# Patient Record
Sex: FEMALE | URBAN - METROPOLITAN AREA
[De-identification: names, ages, dates, MRNs, and addresses within clinical notes are randomized per-mention and may not be internally consistent; named-entity substitution may affect disease eponyms.]

---

## 2023-10-24 ENCOUNTER — APPOINTMENT (RX ONLY)
Dept: URBAN - METROPOLITAN AREA CLINIC 151 | Facility: CLINIC | Age: 68
Setting detail: DERMATOLOGY
End: 2023-10-24

## 2023-10-24 DIAGNOSIS — L57.8 OTHER SKIN CHANGES DUE TO CHRONIC EXPOSURE TO NONIONIZING RADIATION: ICD-10-CM

## 2023-10-24 DIAGNOSIS — L40.8 OTHER PSORIASIS: ICD-10-CM

## 2023-10-24 PROCEDURE — ? COUNSELING

## 2023-10-24 PROCEDURE — ? PRESCRIPTION

## 2023-10-24 PROCEDURE — ? PRESCRIPTION MEDICATION MANAGEMENT

## 2023-10-24 PROCEDURE — ? DIAGNOSIS COMMENT

## 2023-10-24 PROCEDURE — 99204 OFFICE O/P NEW MOD 45 MIN: CPT

## 2023-10-24 PROCEDURE — ? SEPARATE AND IDENTIFIABLE DOCUMENTATION

## 2023-10-24 RX ORDER — ROFLUMILAST 3 MG/G
CREAM TOPICAL
Qty: 60 | Refills: 2 | Status: ERX | COMMUNITY
Start: 2023-10-24

## 2023-10-24 RX ADMIN — ROFLUMILAST: 3 CREAM TOPICAL at 00:00

## 2023-10-24 ASSESSMENT — LOCATION SIMPLE DESCRIPTION DERM
LOCATION SIMPLE: RIGHT AXILLARY VAULT
LOCATION SIMPLE: GENITALIA
LOCATION SIMPLE: RIGHT THIGH
LOCATION SIMPLE: LEFT AXILLARY VAULT
LOCATION SIMPLE: ABDOMEN
LOCATION SIMPLE: LEFT BREAST

## 2023-10-24 ASSESSMENT — LOCATION ZONE DERM
LOCATION ZONE: LEG
LOCATION ZONE: AXILLAE
LOCATION ZONE: TRUNK

## 2023-10-24 ASSESSMENT — LOCATION DETAILED DESCRIPTION DERM
LOCATION DETAILED: LEFT MEDIAL BREAST 7-8:00 REGION
LOCATION DETAILED: RIGHT AXILLARY VAULT
LOCATION DETAILED: RIGHT ANTERIOR PROXIMAL THIGH
LOCATION DETAILED: SUBXIPHOID
LOCATION DETAILED: LEFT AXILLARY VAULT
LOCATION DETAILED: GENITALIA

## 2023-10-24 NOTE — PROCEDURE: PRESCRIPTION MEDICATION MANAGEMENT
Initiate Treatment: Zoryve cream to rashy areas BID
Render In Strict Bullet Format?: No
Detail Level: Zone
Samples Given: Zoryve x 3

## 2023-10-24 NOTE — HPI: OTHER
Condition:: Rash or psoriasis
Please Describe Your Condition:: is a new patient who is being seen for a chief complaint of Rash or psoriasis that is present since April 2023 on her arms, back, and buttocks. Pt states it is possibly “plaque psoriasis.” Itch scale reported as 3/10 within last 24 hours. Pt states her legs are clear. She is also concerned of rash in her breast folds. Pt uses Dove body wash for eczema prone skin and using gold bond psoriasis OTC products. Pt uses OTC anti-fungal cream to breast folds with minimal improvement. Pt states she presents as better than average today.

## 2023-10-24 NOTE — PROCEDURE: DIAGNOSIS COMMENT
Detail Level: Generalized
Comment: Pt denies joint pains - lower back pain in mornings. Suggested pt go to rheumatologist. Rx’ed Zoryve today, given samples x 3 to use on rashy areas. Discussed okay to use for the vaginal area. Fu 2 months. If unresolved, treat more aggressively.
Render Risk Assessment In Note?: no